# Patient Record
Sex: MALE | Race: WHITE | NOT HISPANIC OR LATINO | Employment: FULL TIME | URBAN - METROPOLITAN AREA
[De-identification: names, ages, dates, MRNs, and addresses within clinical notes are randomized per-mention and may not be internally consistent; named-entity substitution may affect disease eponyms.]

---

## 2019-04-02 ENCOUNTER — COMMUNICATION - HEALTHEAST (OUTPATIENT)
Dept: FAMILY MEDICINE | Facility: CLINIC | Age: 44
End: 2019-04-02

## 2019-04-02 ENCOUNTER — OFFICE VISIT - HEALTHEAST (OUTPATIENT)
Dept: FAMILY MEDICINE | Facility: CLINIC | Age: 44
End: 2019-04-02

## 2019-04-02 ENCOUNTER — RECORDS - HEALTHEAST (OUTPATIENT)
Dept: GENERAL RADIOLOGY | Facility: CLINIC | Age: 44
End: 2019-04-02

## 2019-04-02 DIAGNOSIS — Z00.00 ROUTINE GENERAL MEDICAL EXAMINATION AT A HEALTH CARE FACILITY: ICD-10-CM

## 2019-04-02 DIAGNOSIS — R05.9 COUGH: ICD-10-CM

## 2019-04-02 DIAGNOSIS — R35.0 URINARY FREQUENCY: ICD-10-CM

## 2019-04-02 DIAGNOSIS — R19.8 ABDOMINAL FULLNESS: ICD-10-CM

## 2019-04-02 DIAGNOSIS — Z23 NEED FOR TETANUS BOOSTER: ICD-10-CM

## 2019-04-02 LAB
ALBUMIN SERPL-MCNC: 4.4 G/DL (ref 3.5–5)
ALBUMIN UR-MCNC: NEGATIVE MG/DL
ALP SERPL-CCNC: 58 U/L (ref 45–120)
ALT SERPL W P-5'-P-CCNC: 21 U/L (ref 0–45)
AMYLASE SERPL-CCNC: 90 U/L (ref 5–120)
ANION GAP SERPL CALCULATED.3IONS-SCNC: 13 MMOL/L (ref 5–18)
APPEARANCE UR: CLEAR
AST SERPL W P-5'-P-CCNC: 19 U/L (ref 0–40)
BACTERIA #/AREA URNS HPF: ABNORMAL HPF
BASOPHILS # BLD AUTO: 0.1 THOU/UL (ref 0–0.2)
BASOPHILS NFR BLD AUTO: 1 % (ref 0–2)
BILIRUB SERPL-MCNC: 1.4 MG/DL (ref 0–1)
BILIRUB UR QL STRIP: NEGATIVE
BUN SERPL-MCNC: 13 MG/DL (ref 8–22)
CALCIUM SERPL-MCNC: 10.1 MG/DL (ref 8.5–10.5)
CHLORIDE BLD-SCNC: 105 MMOL/L (ref 98–107)
CHOLEST SERPL-MCNC: 228 MG/DL
CO2 SERPL-SCNC: 22 MMOL/L (ref 22–31)
COLOR UR AUTO: YELLOW
CREAT SERPL-MCNC: 0.86 MG/DL (ref 0.7–1.3)
EOSINOPHIL # BLD AUTO: 0.3 THOU/UL (ref 0–0.4)
EOSINOPHIL NFR BLD AUTO: 3 % (ref 0–6)
ERYTHROCYTE [DISTWIDTH] IN BLOOD BY AUTOMATED COUNT: 11.8 % (ref 11–14.5)
FASTING STATUS PATIENT QL REPORTED: NO
GFR SERPL CREATININE-BSD FRML MDRD: >60 ML/MIN/1.73M2
GLUCOSE BLD-MCNC: 84 MG/DL (ref 70–125)
GLUCOSE UR STRIP-MCNC: NEGATIVE MG/DL
HCT VFR BLD AUTO: 49 % (ref 40–54)
HDLC SERPL-MCNC: 49 MG/DL
HGB BLD-MCNC: 16.1 G/DL (ref 14–18)
HGB UR QL STRIP: ABNORMAL
KETONES UR STRIP-MCNC: ABNORMAL MG/DL
LDLC SERPL CALC-MCNC: 156 MG/DL
LEUKOCYTE ESTERASE UR QL STRIP: NEGATIVE
LIPASE SERPL-CCNC: 59 U/L (ref 0–52)
LYMPHOCYTES # BLD AUTO: 3 THOU/UL (ref 0.8–4.4)
LYMPHOCYTES NFR BLD AUTO: 31 % (ref 20–40)
MCH RBC QN AUTO: 29.1 PG (ref 27–34)
MCHC RBC AUTO-ENTMCNC: 32.8 G/DL (ref 32–36)
MCV RBC AUTO: 89 FL (ref 80–100)
MONOCYTES # BLD AUTO: 0.8 THOU/UL (ref 0–0.9)
MONOCYTES NFR BLD AUTO: 8 % (ref 2–10)
MUCOUS THREADS #/AREA URNS LPF: ABNORMAL LPF
NEUTROPHILS # BLD AUTO: 5.6 THOU/UL (ref 2–7.7)
NEUTROPHILS NFR BLD AUTO: 57 % (ref 50–70)
NITRATE UR QL: NEGATIVE
PH UR STRIP: 5.5 [PH] (ref 5–8)
PLATELET # BLD AUTO: 316 THOU/UL (ref 140–440)
PMV BLD AUTO: 7.2 FL (ref 7–10)
POTASSIUM BLD-SCNC: 4.4 MMOL/L (ref 3.5–5)
PROT SERPL-MCNC: 7.4 G/DL (ref 6–8)
RBC # BLD AUTO: 5.53 MILL/UL (ref 4.4–6.2)
RBC #/AREA URNS AUTO: ABNORMAL HPF
SODIUM SERPL-SCNC: 140 MMOL/L (ref 136–145)
SP GR UR STRIP: 1.02 (ref 1–1.03)
SQUAMOUS #/AREA URNS AUTO: ABNORMAL LPF
TRIGL SERPL-MCNC: 115 MG/DL
UROBILINOGEN UR STRIP-ACNC: ABNORMAL
WBC #/AREA URNS AUTO: ABNORMAL HPF
WBC: 9.8 THOU/UL (ref 4–11)

## 2019-04-02 ASSESSMENT — MIFFLIN-ST. JEOR: SCORE: 1824.17

## 2019-04-03 LAB — BACTERIA SPEC CULT: NO GROWTH

## 2019-04-09 ENCOUNTER — COMMUNICATION - HEALTHEAST (OUTPATIENT)
Dept: FAMILY MEDICINE | Facility: CLINIC | Age: 44
End: 2019-04-09

## 2019-04-09 ENCOUNTER — HOSPITAL ENCOUNTER (OUTPATIENT)
Dept: CT IMAGING | Facility: HOSPITAL | Age: 44
Discharge: HOME | End: 2019-04-09
Attending: FAMILY MEDICINE

## 2019-04-09 DIAGNOSIS — R19.8 ABDOMINAL FULLNESS: ICD-10-CM

## 2019-08-14 ENCOUNTER — OFFICE VISIT - HEALTHEAST (OUTPATIENT)
Dept: FAMILY MEDICINE | Facility: CLINIC | Age: 44
End: 2019-08-14

## 2019-08-14 DIAGNOSIS — Z72.0 TOBACCO ABUSE: ICD-10-CM

## 2019-08-14 DIAGNOSIS — F32.1 MODERATE MAJOR DEPRESSION (H): ICD-10-CM

## 2019-08-14 DIAGNOSIS — F41.1 GENERALIZED ANXIETY DISORDER: ICD-10-CM

## 2019-08-14 ASSESSMENT — MIFFLIN-ST. JEOR: SCORE: 1752.15

## 2019-09-03 ENCOUNTER — OFFICE VISIT - HEALTHEAST (OUTPATIENT)
Dept: FAMILY MEDICINE | Facility: CLINIC | Age: 44
End: 2019-09-03

## 2019-09-03 DIAGNOSIS — F32.1 MODERATE MAJOR DEPRESSION (H): ICD-10-CM

## 2019-09-03 DIAGNOSIS — F41.1 GENERALIZED ANXIETY DISORDER: ICD-10-CM

## 2019-09-03 ASSESSMENT — PATIENT HEALTH QUESTIONNAIRE - PHQ9: SUM OF ALL RESPONSES TO PHQ QUESTIONS 1-9: 3

## 2019-09-03 ASSESSMENT — MIFFLIN-ST. JEOR: SCORE: 1728.57

## 2020-03-10 ENCOUNTER — OFFICE VISIT - HEALTHEAST (OUTPATIENT)
Dept: FAMILY MEDICINE | Facility: CLINIC | Age: 45
End: 2020-03-10

## 2020-03-10 DIAGNOSIS — F32.1 MODERATE MAJOR DEPRESSION (H): ICD-10-CM

## 2020-03-10 DIAGNOSIS — F41.1 GENERALIZED ANXIETY DISORDER: ICD-10-CM

## 2020-03-10 ASSESSMENT — PATIENT HEALTH QUESTIONNAIRE - PHQ9: SUM OF ALL RESPONSES TO PHQ QUESTIONS 1-9: 6

## 2020-06-24 ENCOUNTER — COMMUNICATION - HEALTHEAST (OUTPATIENT)
Dept: FAMILY MEDICINE | Facility: CLINIC | Age: 45
End: 2020-06-24

## 2020-06-25 ENCOUNTER — COMMUNICATION - HEALTHEAST (OUTPATIENT)
Dept: FAMILY MEDICINE | Facility: CLINIC | Age: 45
End: 2020-06-25

## 2020-06-26 ENCOUNTER — OFFICE VISIT - HEALTHEAST (OUTPATIENT)
Dept: FAMILY MEDICINE | Facility: CLINIC | Age: 45
End: 2020-06-26

## 2020-06-26 ENCOUNTER — COMMUNICATION - HEALTHEAST (OUTPATIENT)
Dept: FAMILY MEDICINE | Facility: CLINIC | Age: 45
End: 2020-06-26

## 2020-06-26 DIAGNOSIS — S22.42XD CLOSED FRACTURE OF MULTIPLE RIBS OF LEFT SIDE WITH ROUTINE HEALING, SUBSEQUENT ENCOUNTER: ICD-10-CM

## 2020-06-26 RX ORDER — DOCUSATE SODIUM 100 MG/1
100 CAPSULE, LIQUID FILLED ORAL
Status: SHIPPED | COMMUNITY
Start: 2020-06-20 | End: 2022-02-10

## 2020-06-26 RX ORDER — IBUPROFEN 600 MG/1
600 TABLET, FILM COATED ORAL EVERY 6 HOURS PRN
Qty: 50 TABLET | Refills: 3 | Status: SHIPPED | OUTPATIENT
Start: 2020-06-26 | End: 2022-02-10

## 2020-07-03 ENCOUNTER — COMMUNICATION - HEALTHEAST (OUTPATIENT)
Dept: FAMILY MEDICINE | Facility: CLINIC | Age: 45
End: 2020-07-03

## 2020-07-03 ENCOUNTER — AMBULATORY - HEALTHEAST (OUTPATIENT)
Dept: FAMILY MEDICINE | Facility: CLINIC | Age: 45
End: 2020-07-03

## 2020-07-03 DIAGNOSIS — S22.42XD CLOSED FRACTURE OF MULTIPLE RIBS OF LEFT SIDE WITH ROUTINE HEALING, SUBSEQUENT ENCOUNTER: ICD-10-CM

## 2020-09-16 ENCOUNTER — COMMUNICATION - HEALTHEAST (OUTPATIENT)
Dept: FAMILY MEDICINE | Facility: CLINIC | Age: 45
End: 2020-09-16

## 2020-09-16 DIAGNOSIS — F41.1 GENERALIZED ANXIETY DISORDER: ICD-10-CM

## 2020-09-16 DIAGNOSIS — F32.1 MODERATE MAJOR DEPRESSION (H): ICD-10-CM

## 2020-10-13 ENCOUNTER — OFFICE VISIT - HEALTHEAST (OUTPATIENT)
Dept: FAMILY MEDICINE | Facility: CLINIC | Age: 45
End: 2020-10-13

## 2020-10-13 DIAGNOSIS — F32.1 MODERATE MAJOR DEPRESSION (H): ICD-10-CM

## 2020-10-13 DIAGNOSIS — F41.1 GENERALIZED ANXIETY DISORDER: ICD-10-CM

## 2020-10-13 ASSESSMENT — MIFFLIN-ST. JEOR: SCORE: 1785.04

## 2020-10-13 ASSESSMENT — PATIENT HEALTH QUESTIONNAIRE - PHQ9: SUM OF ALL RESPONSES TO PHQ QUESTIONS 1-9: 0

## 2021-05-26 ASSESSMENT — PATIENT HEALTH QUESTIONNAIRE - PHQ9
SUM OF ALL RESPONSES TO PHQ QUESTIONS 1-9: 0
SUM OF ALL RESPONSES TO PHQ QUESTIONS 1-9: 3

## 2021-05-27 ASSESSMENT — PATIENT HEALTH QUESTIONNAIRE - PHQ9: SUM OF ALL RESPONSES TO PHQ QUESTIONS 1-9: 6

## 2021-05-27 NOTE — PROGRESS NOTES
"Assessment:      Healthy male exam.    1. Routine general medical examination at a health care facility  Lipid Profile   2. Abdominal fullness  CT Abdomen Pelvis With Oral With IV Contrast    Comprehensive Metabolic Panel    Amylase    Lipase    HM1(CBC and Differential)    HM1 (CBC with Diff)   3. Cough  XR Chest 2 Views   4. Urinary frequency  Urinalysis    Culture, Urine   5. Need for tetanus booster  Td, Preservative Free (green label)          Plan:      This is a 42 yo male, new to clinic, here for physical exam - this was prompted by the loss of 13 people in his life in the last several months.  He describes them being of \"all ages\" and \"all diagnoses\", but it made him feel very vulnerable.    1.  Abdominal fullness - this has been persistent for several months - he can't lay on one side - will check CT - abdomen; labs as noted  2.  Cough - still smoking - discussed cessation (he is not ready); will check CXR  3.  Urinary Frequency - check UA/UC - doubt infection given duration of symptoms.  4.  Health Maintenance - due for tetanus booster - given today    Subjective:      Roderick Loyola is a 43 y.o. male who presents for an annual exam. The patient reports that there is not domestic violence in his life.     Here because all his friends are dying  Feels like something in his belly - like he really really hungry   Smoking reefers - smokes every day  Smokes cigarettes - 1 ppd  No meth x 16 years - quit on his own  \"i'm so hyper, I smoke because I'm bored\"  Works as  - plastics  Dad passed away age 29 - MVA  PGF passed away age 35 - pancreatic cancer  Had a great step dad - switched last name to step dad's when he got     Had a double hernia when he was 5 years old  Feels like something below his umbilicus  Pees \"like a pregnant women\"  Drinking water  No other medications    With his partner 18 years;  8 years;   Raised 2 step daughters - 1, dental hygienist, 1 in criminal " good  Has 1 grandson - 2-1/2 years old    Mom is alive - 68 yo  - healthy - has some ulcers  She had alcoholism on her side of family - cirrhosis of liver    PGM - bone cancer; pancreatic cancer -        Healthy Habits:   Regular Exercise: No  Sunscreen Use: No  Healthy Diet: trying  Dental Visits Regularly: No  Seat Belt: Yes  Sexually active: Yes  Monthly Self Testicular Exams:  No        Immunization History   Administered Date(s) Administered     Td, adult adsorbed, PF 04/02/2019     Tdap 03/19/2012     Immunization status: due for tetanus.    No exam data present     No current outpatient medications on file.     No current facility-administered medications for this visit.      History reviewed. No pertinent past medical history.  History reviewed. No pertinent surgical history.  Patient has no known allergies.  History reviewed. No pertinent family history.  Social History     Socioeconomic History     Marital status:      Spouse name: Not on file     Number of children: Not on file     Years of education: Not on file     Highest education level: Not on file   Occupational History     Not on file   Social Needs     Financial resource strain: Not on file     Food insecurity:     Worry: Not on file     Inability: Not on file     Transportation needs:     Medical: Not on file     Non-medical: Not on file   Tobacco Use     Smoking status: Current Every Day Smoker     Types: Pipe     Smokeless tobacco: Never Used   Substance and Sexual Activity     Alcohol use: Not on file     Drug use: Not on file     Sexual activity: Not on file   Lifestyle     Physical activity:     Days per week: Not on file     Minutes per session: Not on file     Stress: Not on file   Relationships     Social connections:     Talks on phone: Not on file     Gets together: Not on file     Attends Jain service: Not on file     Active member of club or organization: Not on file     Attends meetings of clubs or organizations: Not  "on file     Relationship status: Not on file     Intimate partner violence:     Fear of current or ex partner: Not on file     Emotionally abused: Not on file     Physically abused: Not on file     Forced sexual activity: Not on file   Other Topics Concern     Not on file   Social History Narrative     Not on file       Review of Systems  Review of Systems     Pertinent positives as noted in HPI; otherwise 12 point ROS negative.        Objective:     Vitals:    04/02/19 1533   BP: 126/76   Pulse: 80   Resp: 20   Temp: 98.1  F (36.7  C)   TempSrc: Oral   Weight: 198 lb 4 oz (89.9 kg)   Height: 6' 0.75\" (1.848 m)     Body mass index is 26.34 kg/m .    Physical  Physical Exam    EXAM:  /76 (Patient Site: Right Arm, Patient Position: Sitting, Cuff Size: Adult Regular)   Pulse 80   Temp 98.1  F (36.7  C) (Oral)   Resp 20   Ht 6' 0.75\" (1.848 m)   Wt 198 lb 4 oz (89.9 kg)   BMI 26.34 kg/m     Gen:  NAD, appears well, well-hydrated  HEENT:  TMs nl, oropharynx benign, nasal mucosa nl, conjunctiva clear  Neck:  Supple, no adenopathy, no thyromegaly, no carotid bruits, no JVD  Lungs:  Clear to auscultation bilaterally  Cor:  RRR no murmur  Abd:  Soft, nontender, BS+, no masses, no guarding or rebound, no HSM  :  Nl male genitalia  Extr:  Neg.  Neuro:  No asymmetry, Nl motor tone/strength, nl sensation, reflexes =, gait nl, nl coordination, CN intact,   Skin:  Warm/dry            "

## 2021-05-31 NOTE — PROGRESS NOTES
"ASSESSMENT/PLAN:  1. Generalized anxiety disorder  buPROPion (WELLBUTRIN XL) 150 MG 24 hr tablet   2. Moderate major depression (H)  buPROPion (WELLBUTRIN XL) 150 MG 24 hr tablet       This is a 43 yo male with:  1.  Underlying mental health disorder - has history of ADD as a child, chemical dependency as an adult - used meth x years (none x 16 years); current daily user of marijuana.  Also an active tobacco smoker (up to 3 ppd).  Now with significant anxiety and major depression.  Is troubled by breakdown of his marriage - trying to work on that, but has been \"out\" of house.  Wants to reconnect with his wife.    ELVER 7 Total Score: 12 (8/14/2019  8:00 AM)  PHQ-9 Total Score: 8 (8/14/2019  8:00 AM)  Patient is not willing to think about therapy at this time, but willing to talk about medications - believes he used Bupropion in past with some success.  We discussed risk/benefits.  Will start Bupropion daily -recheck after 4 weeks (before the next refill).    I have personally spent 30 minutes with this patient today - all in counseling time.      Return in about 4 weeks (around 9/11/2019) for Recheck.      There are no discontinued medications.  There are no Patient Instructions on file for this visit.    Chief Complaint:  Chief Complaint   Patient presents with     Depression     Anxiety       HPI:   Roderick Loyola is a 44 y.o. 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    History reviewed. No pertinent past medical history.  History reviewed. No pertinent surgical history.  Social History     Socioeconomic History     Marital status:      Spouse name: Not on file     Number of children: Not on file     Years of education: Not on file     Highest education level: Not on file   Occupational History     Not on file   Social Needs     Financial resource strain: Not on file     Food insecurity:     Worry: Not on file     Inability: Not on file     Transportation needs:     Medical: Not on file     Non-medical: Not on file   Tobacco Use     Smoking status: Current Every Day Smoker     Types: Pipe     Smokeless tobacco: Never Used   Substance and Sexual Activity     Alcohol use: Not on file     Drug use: Not on file     Sexual activity: Not on file   Lifestyle     Physical activity:     Days per week: Not on file     Minutes per session: Not on file     Stress: Not on file   Relationships     Social connections:     Talks on phone: Not on file     Gets together: Not on file     Attends Buddhism service: Not on file     Active member of club or organization: Not on file     Attends meetings of clubs or organizations: Not on file     Relationship status: Not on file     Intimate partner violence:     Fear of current or ex partner: Not on file     Emotionally abused: Not on file     Physically abused: Not on file     Forced sexual activity: Not on file   Other Topics Concern     Not on file   Social History Narrative     Not on file     History reviewed. No pertinent family history.    Meds:    Current Outpatient Medications:      buPROPion (WELLBUTRIN XL) 150 MG 24 hr tablet, Take 1 tablet (150 mg total) by mouth daily., Disp: 30 tablet, Rfl: 2    Allergies:  No Known Allergies    ROS:  Pertinent positives as noted in HPI; otherwise 12 point ROS negative.      Physical " Exam:  EXAM:  /76 (Patient Site: Right Arm, Patient Position: Sitting, Cuff Size: Adult Regular)   Pulse 78   Resp 16   Ht 6' (1.829 m)   Wt 185 lb (83.9 kg)   BMI 25.09 kg/m     Gen:  NAD, appears well, well-hydrated, agitated/pressured speech  Extr:  Neg.  Neuro:  No asymmetry  Skin:  Warm/dry

## 2021-05-31 NOTE — PROGRESS NOTES
ASSESSMENT/PLAN:  1. Generalized anxiety disorder  buPROPion (WELLBUTRIN XL) 150 MG 24 hr tablet   2. Moderate major depression (H)  buPROPion (WELLBUTRIN XL) 150 MG 24 hr tablet       This is a 43 yo male with h/o significant anxiety and depression; currently has aggravation of this symptoms due to relationship issues.  He tells me he has resolved some of these issues and is feeling a little better, but wonders about dosing of his medication.  We discussed possibly increasing his Wellbutrin - doubling this current dosing.  He will try this at this time.  We discussed trying to quit smoking marijuana as that leads to other challenges for him.   PHQ-9 Total Score: 3 (9/3/2019  4:00 PM)  ELVER 7 Total Score: 12 (8/14/2019  8:00 AM)    Patient has improved symptoms, in general, but still finding it difficult to get through his day - will   Return in about 3 months (around 12/3/2019) for Recheck.      Medications Discontinued During This Encounter   Medication Reason     buPROPion (WELLBUTRIN XL) 150 MG 24 hr tablet Reorder     There are no Patient Instructions on file for this visit.    Chief Complaint:  Chief Complaint   Patient presents with     Follow-up       HPI:   Roderick Looyla is a 44 y.o. male c/o  Less marijuana use - 3 joints in last weekend  Getting back together with wife  Wife's brother hung himself in past; her daughter is talking about putting a gun to her head      PMH:   Patient Active Problem List    Diagnosis Date Noted     Tobacco abuse 08/14/2019     History reviewed. No pertinent past medical history.  History reviewed. No pertinent surgical history.  Social History     Socioeconomic History     Marital status:      Spouse name: Not on file     Number of children: Not on file     Years of education: Not on file     Highest education level: Not on file   Occupational History     Not on file   Social Needs     Financial resource strain: Not on file     Food insecurity:     Worry: Not on  file     Inability: Not on file     Transportation needs:     Medical: Not on file     Non-medical: Not on file   Tobacco Use     Smoking status: Current Every Day Smoker     Types: Pipe     Smokeless tobacco: Never Used   Substance and Sexual Activity     Alcohol use: Not on file     Drug use: Not on file     Sexual activity: Not on file   Lifestyle     Physical activity:     Days per week: Not on file     Minutes per session: Not on file     Stress: Not on file   Relationships     Social connections:     Talks on phone: Not on file     Gets together: Not on file     Attends Synagogue service: Not on file     Active member of club or organization: Not on file     Attends meetings of clubs or organizations: Not on file     Relationship status: Not on file     Intimate partner violence:     Fear of current or ex partner: Not on file     Emotionally abused: Not on file     Physically abused: Not on file     Forced sexual activity: Not on file   Other Topics Concern     Not on file   Social History Narrative     Not on file     History reviewed. No pertinent family history.    Meds:    Current Outpatient Medications:      buPROPion (WELLBUTRIN XL) 150 MG 24 hr tablet, Take 2 tablets (300 mg total) by mouth daily., Disp: 60 tablet, Rfl: 2    Allergies:  No Known Allergies    ROS:  Pertinent positives as noted in HPI; otherwise 12 point ROS negative.      Physical Exam:  EXAM:  /68 (Patient Site: Left Arm, Patient Position: Sitting, Cuff Size: Adult Regular)   Pulse 86   Temp 97.4  F (36.3  C) (Oral)   Resp 18   Ht 6' (1.829 m)   Wt 179 lb 12.8 oz (81.6 kg)   BMI 24.39 kg/m     Gen:  NAD, appears well, well-hydrated, anxious  HEENT:  TMs nl, oropharynx benign, nasal mucosa nl, conjunctiva clear  Neck:  Supple, no adenopathy, no thyromegaly, no carotid bruits, no JVD  Lungs:  Clear to auscultation bilaterally  Cor:  RRR no murmur  Abd:  Soft, nontender, BS+, no masses, no guarding or rebound, no HSM  Extr:   Neg.  Neuro:  No asymmetry  Skin:  Warm/dry

## 2021-06-02 VITALS — WEIGHT: 198.25 LBS | HEIGHT: 73 IN | BODY MASS INDEX: 26.27 KG/M2

## 2021-06-03 VITALS
BODY MASS INDEX: 24.35 KG/M2 | WEIGHT: 179.8 LBS | TEMPERATURE: 97.4 F | SYSTOLIC BLOOD PRESSURE: 120 MMHG | DIASTOLIC BLOOD PRESSURE: 68 MMHG | RESPIRATION RATE: 18 BRPM | HEART RATE: 86 BPM | HEIGHT: 72 IN

## 2021-06-03 VITALS — WEIGHT: 185 LBS | HEIGHT: 72 IN | BODY MASS INDEX: 25.06 KG/M2

## 2021-06-04 VITALS
SYSTOLIC BLOOD PRESSURE: 109 MMHG | RESPIRATION RATE: 18 BRPM | HEART RATE: 85 BPM | BODY MASS INDEX: 26.18 KG/M2 | WEIGHT: 193 LBS | DIASTOLIC BLOOD PRESSURE: 72 MMHG

## 2021-06-04 VITALS
HEIGHT: 72 IN | TEMPERATURE: 98.6 F | BODY MASS INDEX: 26.28 KG/M2 | DIASTOLIC BLOOD PRESSURE: 74 MMHG | RESPIRATION RATE: 18 BRPM | SYSTOLIC BLOOD PRESSURE: 121 MMHG | WEIGHT: 194 LBS | HEART RATE: 105 BPM

## 2021-06-04 VITALS
DIASTOLIC BLOOD PRESSURE: 85 MMHG | WEIGHT: 197 LBS | SYSTOLIC BLOOD PRESSURE: 127 MMHG | HEART RATE: 73 BPM | BODY MASS INDEX: 26.72 KG/M2 | RESPIRATION RATE: 16 BRPM

## 2021-06-06 NOTE — PROGRESS NOTES
ASSESSMENT/PLAN:  1. Generalized anxiety disorder  buPROPion (WELLBUTRIN XL) 150 MG 24 hr tablet   2. Moderate major depression (H)  buPROPion (WELLBUTRIN XL) 150 MG 24 hr tablet       This is a 45 yo male with   Generalized anxiety disorder and moderate major depression; he reports that he has gotten better - feels that his medication is working well currently - isn't interested in higher dosing.    ELVER 7 Total Score: 12 (8/14/2019  8:00 AM)  PHQ-9 Total Score: 6 (3/10/2020  3:00 PM)  Will refill his current Bupropion dosing.        Return in about 6 months (around 9/10/2020) for Recheck.      Medications Discontinued During This Encounter   Medication Reason     buPROPion (WELLBUTRIN XL) 150 MG 24 hr tablet Reorder     There are no Patient Instructions on file for this visit.    Chief Complaint:  Chief Complaint   Patient presents with     Follow Up       HPI:   Roderick Loyola is a 44 y.o. male c/o  Things are going better   Medicine doesn't calm him down but it calms his brain down  Fixing up the house to sell it - got overwhelming - now, looking for houses  Just taking 1 per day  When took 2/day, drank a mocha with it and felt more anxious  Was going to pay off bills -       PMH:   Patient Active Problem List    Diagnosis Date Noted     Tobacco abuse 08/14/2019     History reviewed. No pertinent past medical history.  History reviewed. No pertinent surgical history.  Social History     Socioeconomic History     Marital status:      Spouse name: Not on file     Number of children: Not on file     Years of education: Not on file     Highest education level: Not on file   Occupational History     Not on file   Social Needs     Financial resource strain: Not on file     Food insecurity     Worry: Not on file     Inability: Not on file     Transportation needs     Medical: Not on file     Non-medical: Not on file   Tobacco Use     Smoking status: Current Every Day Smoker     Types: Pipe     Smokeless  tobacco: Never Used   Substance and Sexual Activity     Alcohol use: Not on file     Drug use: Not on file     Sexual activity: Not on file   Lifestyle     Physical activity     Days per week: Not on file     Minutes per session: Not on file     Stress: Not on file   Relationships     Social connections     Talks on phone: Not on file     Gets together: Not on file     Attends Taoism service: Not on file     Active member of club or organization: Not on file     Attends meetings of clubs or organizations: Not on file     Relationship status: Not on file     Intimate partner violence     Fear of current or ex partner: Not on file     Emotionally abused: Not on file     Physically abused: Not on file     Forced sexual activity: Not on file   Other Topics Concern     Not on file   Social History Narrative     Not on file     History reviewed. No pertinent family history.    Meds:    Current Outpatient Medications:      buPROPion (WELLBUTRIN XL) 150 MG 24 hr tablet, Take 1 tablet (150 mg total) by mouth daily., Disp: 90 tablet, Rfl: 1    Allergies:  No Known Allergies    ROS:  Pertinent positives as noted in HPI; otherwise 12 point ROS negative.      Physical Exam:  EXAM:  /85 (Patient Site: Right Arm, Patient Position: Sitting, Cuff Size: Adult Large)   Pulse 73   Resp 16   Wt 197 lb (89.4 kg)   BMI 26.72 kg/m     Gen:  NAD, appears well, well-hydrated, speech sl pressured  HEENT:  TMs nl, oropharynx benign, nasal mucosa nl, conjunctiva clear  Neck:  Supple, no adenopathy, no thyromegaly, no carotid bruits, no JVD  Lungs:  Clear to auscultation bilaterally  Cor:  RRR no murmur  Abd:  Soft, nontender, BS+, no masses, no guarding or rebound, no HSM  Extr:  Neg.  Neuro:  No asymmetry  Skin:  Warm/dry        Results:

## 2021-06-09 ENCOUNTER — COMMUNICATION - HEALTHEAST (OUTPATIENT)
Dept: FAMILY MEDICINE | Facility: CLINIC | Age: 46
End: 2021-06-09

## 2021-06-09 DIAGNOSIS — F32.1 MODERATE MAJOR DEPRESSION (H): ICD-10-CM

## 2021-06-09 DIAGNOSIS — F41.1 GENERALIZED ANXIETY DISORDER: ICD-10-CM

## 2021-06-09 NOTE — PROGRESS NOTES
Assessment/ Plan  1. Closed fracture of multiple ribs of left side with routine healing, subsequent encounter  Additional Vicodin use to help sleep.  He has a few Tylenol 3 left, recommend using mostly ibuprofen plus acetaminophen during the day.    Recommend incentive spirometry for at least deep breathing to prevent pneumonia.    Work restrictions filled out.  Excused him through July 5, back to work July 6 with limited lifting, bending or twisting through July 20.  Please see letter.  Also, short-term disability form filled out.        - ibuprofen (ADVIL,MOTRIN) 600 MG tablet; Take 1 tablet (600 mg total) by mouth every 6 (six) hours as needed for pain.  Dispense: 50 tablet; Refill: 3  - HYDROcodone-acetaminophen 5-325 mg per tablet; Take 1-2 tablets by mouth every 6 (six) hours as needed for pain. Maximum 8 tablets/day  Dispense: 20 tablet; Refill: 0    Body mass index is 26.18 kg/m .    Subjective  CC:  Chief Complaint   Patient presents with     Hospital Visit Follow Up     broken ribs     HPI:  Roderick is a 45-year-old with history of a fall into the post on 6/20/2020 gaining transverse fractures of posterior lateral ribs on left side at at 3 levels 9, 10, 11.  Patient had been drinking alcohol and fell 3 to 4 feet onto a fence post.  CT scan showed tiny amount of subcutaneous and intramuscular emphysema but no significant pneumothorax.  He was treated with Tylenol 3 for severe pain, then told to use ibuprofen/Tylenol.  Incentive spirometry was prescribed for the following 3 days.  Patient subsequently had a forceful cough with increased pain 6/23/2020 return to the emergency room.  Chest x-ray was done at that time, did not show any significant changes.  He was given hydrocodone/acetaminophen    Patient is slated to go back to work soon and does not think he can do this.  Has a vacation scheduled after July 4 and hoping to be off work till that time so that he can recover.  Still needing opiate medication to  sleep.  Insist that he is not overusing this.  Openly discussed his history of methamphetamine addiction and sobriety which he is quite proud of.  Has no intention of getting hooked on opiates.  His pain is getting better and he is decreased his us  Patient Active Problem List   Diagnosis     Tobacco abuse     Current medications reviewed as follows:  Current Outpatient Medications on File Prior to Visit   Medication Sig     buPROPion (WELLBUTRIN XL) 150 MG 24 hr tablet Take 1 tablet (150 mg total) by mouth daily.     docusate sodium (COLACE) 100 MG capsule Take 100 mg by mouth.     HYDROcodone-acetaminophen 5-325 mg per tablet Take 1 tablet by mouth.     No current facility-administered medications on file prior to visit.      Social History     Tobacco Use   Smoking Status Current Every Day Smoker     Types: Pipe   Smokeless Tobacco Never Used     Social History     Social History Narrative     Not on file     Patient Care Team:  Gracy Lawson MD as PCP - General (Family Medicine)  Gracy Lawson MD as Assigned PCP  ROS  Denies shortness of breath, fever, chills, cough      Objective  Physical Exam  Vitals:    06/26/20 0949   BP: 109/72   Patient Site: Right Arm   Patient Position: Sitting   Cuff Size: Adult Regular   Pulse: 85   Resp: 18   Weight: 193 lb (87.5 kg)     Patient appears somewhat older than stated age, no acute distress.  Chest clear to auscultation bilaterally, equal breath sounds bilaterally.  Large bruise on left posterior lateral chest wall.  Diagnostics  Reviewed x-ray results as well as labs.  Patient did have an elevated white blood cell count, presumably from demargination.    Please note: Voice recognition software was used in this dictation.  It may therefore contain typographical errors.

## 2021-06-09 NOTE — TELEPHONE ENCOUNTER
Medication Request  Medication name: Hydrocodone   [facility]ed [facility]  Reason for request: Patient wants more pain medication.   When did you use medication last?:  Last night to go to sleep. 07/02/20  Patient offered appointment:  yes.  Patient is leaving out of town.  Okay to leave a detailed message: yes

## 2021-06-09 NOTE — TELEPHONE ENCOUNTER
Who is requesting the letter?  Roderick  Why is the letter needed? Patient was seen 6/20/20 at [facility] ED for rib pain.  He has been diagnosed with 3 broken ribs.  He does not feel he can work this week and next week due to pain.  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  Address the letter to [facility].  How would you like this letter returned?   Patient will  when it is ready.    Okay to leave a detailed message? Yes

## 2021-06-09 NOTE — TELEPHONE ENCOUNTER
Who is calling:  Patient   Reason for Call:  calling to check on below request, please advise asap!  Date of last appointment with primary care: 3/10/2020  Okay to leave a detailed message: Yes

## 2021-06-09 NOTE — TELEPHONE ENCOUNTER
He should have gotten a work letter from the ER.  If he needs something beyond that, we will need a visit.  I don't have any information from [facility].

## 2021-06-09 NOTE — TELEPHONE ENCOUNTER
Called and spoke with Roderick Loyola , Message was given, Roderick Loyola  understood, no further questions.

## 2021-06-09 NOTE — TELEPHONE ENCOUNTER
New Appointment Needed  What is the reason for the visit:    Inpatient/ED Follow Up Appt Request  At what hospital or facility were you seen?: [facility]  What is the reason you were seen?: Broke three ribs  What date were you admitted?: 6/20/2020  What date were you discharged?: 6/20/2020  What was the recommended timeframe for your follow up appointment?: 7 days  Provider Preference: PCP only  How soon do you need to be seen?: As soon as possible  Okay to leave a detailed message:  Yes

## 2021-06-09 NOTE — TELEPHONE ENCOUNTER
Called and spoke with Roderick Loyola, Message was given, Roderick Loyola understood, no further questions. Scheduled patient for in clinic visit for EDF on 6/26/2020.

## 2021-06-09 NOTE — TELEPHONE ENCOUNTER
Medication Request  Medication name: Norco  Requested Pharmacy: [facility]  Reason for request: Patient requesting something for his rib pain. Please advise asap  When did you use medication last?:  This morning  Patient offered appointment:  patient declined  Okay to leave a detailed message: yes

## 2021-06-10 RX ORDER — BUPROPION HYDROCHLORIDE 150 MG/1
TABLET ORAL
Qty: 30 TABLET | Refills: 0 | Status: SHIPPED | OUTPATIENT
Start: 2021-06-10 | End: 2021-09-28

## 2021-06-12 NOTE — PROGRESS NOTES
"ASSESSMENT/PLAN:  1. Generalized anxiety disorder  buPROPion (WELLBUTRIN XL) 150 MG 24 hr tablet   2. Moderate major depression (H)  buPROPion (WELLBUTRIN XL) 150 MG 24 hr tablet       This is a 46 yo male with significant anxiety and depression - much of this is related to difficult relationships.  He reports feeling happier with life in general for now.  Has been using Bupropion.  He and his wife are getting along - one daughter has purchased a home and will be moving out soon.    PHQ-9 Total Score: 0 (10/13/2020 12:15 PM)  Doing well with current dose of Bupropion - does not want to change it.  I think it is reasonable for him to take this medication.  OK to refill today.     Return in about 6 months (around 4/13/2021) for Recheck.      Medications Discontinued During This Encounter   Medication Reason     HYDROcodone-acetaminophen 5-325 mg per tablet Therapy completed     HYDROcodone-acetaminophen 5-325 mg per tablet Therapy completed     buPROPion (WELLBUTRIN XL) 150 MG 24 hr tablet Reorder     There are no Patient Instructions on file for this visit.    Chief Complaint:  Chief Complaint   Patient presents with     medication check     Leg Pain     right side       HPI:   Roderick Loyola is a 45 y.o. male c/o  Now working 2nd shift  Was tearing down his garage - 7/2/2020 - fell off a retaining wall (had some alcohol on board) - broke several ribs   Took pain pills x 3 weeks, then off - \"I don't like those things\"    Has sharp pain in right leg - comes and goes  Ribs healed - last couple days, feels a little pain        PMH:   Patient Active Problem List    Diagnosis Date Noted     Tobacco abuse 08/14/2019     No past medical history on file.  No past surgical history on file.  Social History     Socioeconomic History     Marital status:      Spouse name: Not on file     Number of children: Not on file     Years of education: Not on file     Highest education level: Not on file   Occupational History " "    Not on file   Social Needs     Financial resource strain: Not on file     Food insecurity     Worry: Not on file     Inability: Not on file     Transportation needs     Medical: Not on file     Non-medical: Not on file   Tobacco Use     Smoking status: Current Every Day Smoker     Types: Pipe     Smokeless tobacco: Never Used   Substance and Sexual Activity     Alcohol use: Not on file     Drug use: Not on file     Sexual activity: Not on file   Lifestyle     Physical activity     Days per week: Not on file     Minutes per session: Not on file     Stress: Not on file   Relationships     Social connections     Talks on phone: Not on file     Gets together: Not on file     Attends [employer] service: Not on file     Active member of club or organization: Not on file     Attends meetings of clubs or organizations: Not on file     Relationship status: Not on file     Intimate partner violence     Fear of current or ex partner: Not on file     Emotionally abused: Not on file     Physically abused: Not on file     Forced sexual activity: Not on file   Other Topics Concern     Not on file   Social History Narrative     Not on file     No family history on file.    Meds:    Current Outpatient Medications:      buPROPion (WELLBUTRIN XL) 150 MG 24 hr tablet, TAKE 1 TABLET(150 MG) BY MOUTH DAILY, Disp: 90 tablet, Rfl: 1     docusate sodium (COLACE) 100 MG capsule, Take 100 mg by mouth., Disp: , Rfl:      ibuprofen (ADVIL,MOTRIN) 600 MG tablet, Take 1 tablet (600 mg total) by mouth every 6 (six) hours as needed for pain., Disp: 50 tablet, Rfl: 3    Allergies:  No Known Allergies    ROS:  Pertinent positives as noted in HPI; otherwise 12 point ROS negative.      Physical Exam:  EXAM:  /74 (Patient Site: Right Arm, Patient Position: Sitting, Cuff Size: Adult Regular)   Pulse (!) 105   Temp 98.6  F (37  C) (Tympanic)   Resp 18   Ht 5' 11.5\" (1.816 m)   Wt 194 lb (88 kg)   BMI 26.68 kg/m     Gen:  NAD, appears well, " well-hydrated, agitated  HEENT:  TMs nl, oropharynx benign, nasal mucosa nl, conjunctiva clear  Neck:  Supple, no adenopathy, no thyromegaly, no carotid bruits, no JVD  Lungs:  Clear to auscultation bilaterally  Cor:  RRR no murmur  Abd:  Soft, nontender, BS+, no masses, no guarding or rebound, no HSM  Extr:  Neg.  Neuro:  No asymmetry  Skin:  Warm/dry        Results:

## 2021-06-16 PROBLEM — Z72.0 TOBACCO ABUSE: Status: ACTIVE | Noted: 2019-08-14

## 2021-06-19 NOTE — LETTER
Letter by Gracy Lawson MD at      Author: Gracy Lawson MD Service: -- Author Type: --    Filed:  Encounter Date: 4/9/2019 Status: (Other)         Roderick Loyola  [address]  [place] [address]             April 9, 2019         Dear Mr. Loyola,    Below are the results from your recent visit:    Resulted Orders   Comprehensive Metabolic Panel   Result Value Ref Range    Sodium 140 136 - 145 mmol/L    Potassium 4.4 3.5 - 5.0 mmol/L    Chloride 105 98 - 107 mmol/L    CO2 22 22 - 31 mmol/L    Anion Gap, Calculation 13 5 - 18 mmol/L    Glucose 84 70 - 125 mg/dL    BUN 13 8 - 22 mg/dL    Creatinine 0.86 0.70 - 1.30 mg/dL    GFR MDRD Af Amer >60 >60 mL/min/1.73m2    GFR MDRD Non Af Amer >60 >60 mL/min/1.73m2    Bilirubin, Total 1.4 (H) 0.0 - 1.0 mg/dL    Calcium 10.1 8.5 - 10.5 mg/dL    Protein, Total 7.4 6.0 - 8.0 g/dL    Albumin 4.4 3.5 - 5.0 g/dL    Alkaline Phosphatase 58 45 - 120 U/L    AST 19 0 - 40 U/L    ALT 21 0 - 45 U/L    Narrative    Fasting Glucose reference range is 70-99 mg/dL per  American Diabetes Association (ADA) guidelines.   Amylase   Result Value Ref Range    Amylase 90 5 - 120 U/L   Lipase   Result Value Ref Range    Lipase 59 (H) 0 - 52 U/L   Urinalysis   Result Value Ref Range    Color, UA Yellow Colorless, Yellow, Straw, Light Yellow    Clarity, UA Clear Clear    Glucose, UA Negative Negative    Bilirubin, UA Negative Negative    Ketones, UA 40 mg/dL (!) Negative    Specific Gravity, UA 1.025 1.005 - 1.030    Blood, UA Trace (!) Negative    pH, UA 5.5 5.0 - 8.0    Protein, UA Negative Negative mg/dL    Urobilinogen, UA 0.2 E.U./dL 0.2 E.U./dL, 1.0 E.U./dL    Nitrite, UA Negative Negative    Leukocytes, UA Negative Negative    Bacteria, UA Few (!) None Seen hpf    RBC, UA 3-5 (!) None Seen, 0-2 hpf    WBC, UA 0-5 None Seen, 0-5 hpf    Squam Epithel, UA 5-10 (!) None Seen, 0-5 lpf    Mucus, UA Few (!) None Seen lpf   Culture, Urine   Result Value Ref Range     Culture No Growth    Lipid Profile   Result Value Ref Range    Triglycerides 115 <=149 mg/dL    Cholesterol 228 (H) <=199 mg/dL    LDL Calculated 156 (H) <=129 mg/dL    HDL Cholesterol 49 >=40 mg/dL    Patient Fasting > 8hrs? No    HM1 (CBC with Diff)   Result Value Ref Range    WBC 9.8 4.0 - 11.0 thou/uL    RBC 5.53 4.40 - 6.20 mill/uL    Hemoglobin 16.1 14.0 - 18.0 g/dL    Hematocrit 49.0 40.0 - 54.0 %    MCV 89 80 - 100 fL    MCH 29.1 27.0 - 34.0 pg    MCHC 32.8 32.0 - 36.0 g/dL    RDW 11.8 11.0 - 14.5 %    Platelets 316 140 - 440 thou/uL    MPV 7.2 7.0 - 10.0 fL    Neutrophils % 57 50 - 70 %    Lymphocytes % 31 20 - 40 %    Monocytes % 8 2 - 10 %    Eosinophils % 3 0 - 6 %    Basophils % 1 0 - 2 %    Neutrophils Absolute 5.6 2.0 - 7.7 thou/uL    Lymphocytes Absolute 3.0 0.8 - 4.4 thou/uL    Monocytes Absolute 0.8 0.0 - 0.9 thou/uL    Eosinophils Absolute 0.3 0.0 - 0.4 thou/uL    Basophils Absolute 0.1 0.0 - 0.2 thou/uL       Your labs are generally normal.  The bilirubin is just slightly high - this is a liver test.  Your CT scan is normal as well - and shows no abnormalities.  I would suggest recheck on this lab in a couple months.      Please call with questions or contact us using [employer].    Sincerely,        Electronically signed by Gracy Lawson MD

## 2021-06-19 NOTE — LETTER
"Letter by Gracy Lawson MD at      Author: Gracy Lawson MD Service: -- Author Type: --    Filed:  Encounter Date: 4/9/2019 Status: (Other)         Roderick Loyola  [address]  [place] [address]             April 9, 2019         Dear Mr. Loyola,    Below are the results from your recent visit:    Resulted Orders   CT Abdomen Pelvis With Oral With IV Contrast    Narrative    EXAM: CT ABDOMEN PELVIS W ORAL W IV CONTRAST  LOCATION: [facility]  DATE/TIME: 4/9/2019 4:28 PM    INDICATION: abdominal pain - \"fullness\"  COMPARISON: None.  TECHNIQUE: Helical enhanced thin-section CT scan of the abdomen and pelvis was performed following injection of IV contrast. Multiplanar reformats were obtained. Dose reduction techniques were used.  CONTRAST: Iohexol (Omni) 100 mL    FINDINGS:   LUNG BASES: Negative.    ABDOMEN: Liver, gallbladder, bile ducts, spleen, pancreas, adrenal glands, and kidneys are unremarkable.    PELVIS: Mild colonic diverticulosis. No dilated bowel or inflammation. No free fluid or gas. Normal appendix.    MUSCULOSKELETAL: Negative.      Impression    CONCLUSION:   No acute process in the abdomen or pelvis.         Your scan is normal. Nothing \"lurking\" in your belly!    Please call with questions or contact us using [employer].    Sincerely,        Electronically signed by Gracy Lawson MD       "

## 2021-06-19 NOTE — LETTER
Letter by Gracy Lawson MD at      Author: Gracy Lawson MD Service: -- Author Type: --    Filed:  Encounter Date: 4/2/2019 Status: (Other)         Roderick Loyola  [address] [place] [address]             April 2, 2019         Dear Mr. Loyola,    Below are the results from your recent visit:    Resulted Orders   XR Chest 2 Views    Narrative    XR CHEST 2 VIEWS  4/2/2019 4:46 PM    INDICATION: Cough.  COMPARISON: None.    FINDINGS: Negative chest. Lungs are clear.         Your chest xray is normal.     Please call with questions or contact us using [employer].    Sincerely,        Electronically signed by Gracy Lawson MD

## 2021-06-20 NOTE — LETTER
Letter by Rod Alvarado MD at      Author: Rod Alvarado MD Service: -- Author Type: --    Filed:  Encounter Date: 6/26/2020 Status: (Other)         6/26/2020  Roderick Loyola      To Whom It May Concern,    Roderick Loyola was seen today for follow-up of rib fracture sustained 6/20/2020.    He should be released from work from date of injury through 7/5/2022.  He may return to work 7/6/2020 with following directions in place.  No lifting more than 25 pounds, no lifting carrying of weights 11 to 25 pounds any more than occasionally, and maximum of frequent lifting/carrying of any weight whatsoever.  No more than occasional bending and twisting.  All restrictions 7/6 through 7/20/2020.  After this date, no restrictions unless this letter is updated.      Feel free to contact me with questions.    Sincerely,          Rod Alvarado MD

## 2021-06-25 NOTE — TELEPHONE ENCOUNTER
Refill Approved    Rx renewed per Medication Renewal Policy. Medication was last renewed on 10/13/2020.  Last office visit was 10/13/2020 with PCP. Was due for appt in April.    Karine Lee, [facility] Triage/Med Refill 6/10/2021     Requested Prescriptions   Pending Prescriptions Disp Refills     buPROPion (WELLBUTRIN XL) 150 MG 24 hr tablet 90 tablet 0     Sig: TAKE 1 TABLET(150 MG) BY MOUTH DAILY. Please make appointment for next refill       Tricyclics/Misc Antidepressant/Antianxiety Meds Refill Protocol Passed - 6/9/2021  9:37 AM        Passed - PCP or prescribing provider visit in last year     Last office visit with prescriber/PCP: 10/13/2020 Gracy Lawson MD OR same dept: 10/13/2020 Gracy Lawson MD OR same specialty: 10/13/2020 Gracy Lawson MD  Last physical: 4/2/2019 Last MTM visit: Visit date not found   Next visit within 3 mo: Visit date not found  Next physical within 3 mo: Visit date not found  Prescriber OR PCP: Gracy Lawson MD  Last diagnosis associated with med order: 1. Generalized anxiety disorder  - buPROPion (WELLBUTRIN XL) 150 MG 24 hr tablet; TAKE 1 TABLET(150 MG) BY MOUTH DAILY. Please make appointment for next refill  Dispense: 90 tablet; Refill: 0    2. Moderate major depression (H)  - buPROPion (WELLBUTRIN XL) 150 MG 24 hr tablet; TAKE 1 TABLET(150 MG) BY MOUTH DAILY. Please make appointment for next refill  Dispense: 90 tablet; Refill: 0    If protocol passes may refill for 12 months if within 3 months of last provider visit (or a total of 15 months).

## 2022-02-10 ENCOUNTER — OFFICE VISIT (OUTPATIENT)
Dept: FAMILY MEDICINE | Facility: CLINIC | Age: 47
End: 2022-02-10

## 2022-02-10 VITALS
HEIGHT: 73 IN | OXYGEN SATURATION: 98 % | WEIGHT: 211 LBS | DIASTOLIC BLOOD PRESSURE: 72 MMHG | HEART RATE: 104 BPM | BODY MASS INDEX: 27.96 KG/M2 | SYSTOLIC BLOOD PRESSURE: 126 MMHG | TEMPERATURE: 98.1 F

## 2022-02-10 DIAGNOSIS — K92.1 BLOOD IN STOOL: Primary | ICD-10-CM

## 2022-02-10 PROBLEM — F41.1 GAD (GENERALIZED ANXIETY DISORDER): Status: ACTIVE | Noted: 2022-02-10

## 2022-02-10 PROCEDURE — 99214 OFFICE O/P EST MOD 30 MIN: CPT | Performed by: FAMILY MEDICINE

## 2022-02-10 ASSESSMENT — MIFFLIN-ST. JEOR: SCORE: 1880.22

## 2022-02-10 ASSESSMENT — PATIENT HEALTH QUESTIONNAIRE - PHQ9: SUM OF ALL RESPONSES TO PHQ QUESTIONS 1-9: 0

## 2022-02-10 NOTE — PROGRESS NOTES
[facility] IN-OFFICE Visit  Phone : none    Chief Complaint:  Chief Complaint   Patient presents with     Rectal Problem     was seen in emergency and had testing done     Referral     for colonoscopy       Assessment/Plan:  Blood in stool  Nothing in past couple of days.  Had 3 days in a row early Feb and went to [facility] for full evaluation.  Did review Care Everywhere in his linked chart and normal exam and blood testing.  Advised to follow-up with colonoscopy.  Elevated ETOH level at that visit but pt denies extensive use.    - Adult Gastro Ref - Procedure Only  - Care Coordination Referral    Patient declined pneumonia nand influenza vaccines today due to lack of insurance coverage. Care coordination referral made to assist patient with applying for insurance. No need to repeat lab work today since it was done at the ED. Patient will schedule colonoscopy at his convenience when he has insurance.  Return in about 6 weeks (around 3/24/2022) for Health Maintenance Visit.      Diagnosis or treatment significantly limited by social determinants of health - no insurance .    Patient Education/AVS:  There are no Patient Instructions on file for this visit.    HPI:   Roderick Loyola is a 46 year old male and presents to clinic today for the following health issue: blood in the stools. He says the blood is primarily in the toilet but has noticed a streak in his stool. This has been on-going six months approximately once a month, but last week it occurred three days in a row which prompted him to go the the ED. Patient reports no hemorrhoids were found and all lab work was normal. ED physician recommended he follow up with primary care for a colonoscopy referral. He denies straining, difficulty passing stools, or hard stools. He reports he has increased gas but denies nausea, vomiting, diarrhea, or decreased appetite. He reports he has a good appetite and cites that as a  "reason for his weight gain. He did have LLQ pain the other day that resolved with defecation. Denies urinary symptoms. His family history is positive for pancreatic cancer (paternal grandfather) but no colorectal cancer.    No preventive care in several years.      History summarized from1-2:Seen by Dr Aguirre 10/13/20 for ELVER and MDD treated with wellbutrin 150mg daily and not since then. He is curently not taking Wellbutrin or prn ibuprofen. He was prescribed Colace at one point but is no longer taking it.  Old Records-1: Outside allergies, meds, problems and immunizations were reconciled as needed from CareEverywhere  Radiology tests reviewed-1: normal chest/abd/pelvic CT 6/2020  Lab tests reviewed-1: normal cbc 2019  2/3/22 [facility] ER visit reviewed in his linked [place] chart:  Normal cbc, cmp. Etc  Elevated ETOH level     Social History     Social History Narrative    Lives with Wife        Physical Exam:  /72 (BP Location: Right arm, Patient Position: Sitting, Cuff Size: Adult Regular)   Pulse 104   Temp 98.1  F (36.7  C)   Ht 1.845 m (6' 0.64\")   Wt 95.7 kg (211 lb)   SpO2 98%   BMI 28.12 kg/m   Body mass index is 28.12 kg/m . No LMP for male patient.  Vital signs reviewed  Wt Readings from Last 3 Encounters:   02/10/22 95.7 kg (211 lb)   10/13/20 88 kg (194 lb)   06/26/20 87.5 kg (193 lb)       PHQ-2 Score:     No flowsheet data found.      All normal as below except abnormalities include: LLQ tenderness  General is a  46 year old male sitting comfortably in no apparent distress wearing a mask.  Abd:  +BS, soft, ND,  No masses or organomegaly  Skin: No lesions or rashes noted  Neuro/MSK: Able to ambulate around the exam room with equal movement, strength and normal coordination of the upper and lower extremeties symmetrically    Jen Pimentel, Student NP  Physician Attestation   I, Razia Hathaway, saw this patient and have discussed and personally reviewed information outlined in this " document.    I agree with the findings and plan of care as documented in the note.      MD Razia Carlin MD  [facility]

## 2022-02-11 ENCOUNTER — PATIENT OUTREACH (OUTPATIENT)
Dept: CARE COORDINATION | Facility: CLINIC | Age: 47
End: 2022-02-11
Payer: COMMERCIAL

## 2022-02-14 ENCOUNTER — PATIENT OUTREACH (OUTPATIENT)
Dept: CARE COORDINATION | Facility: CLINIC | Age: 47
End: 2022-02-14
Payer: COMMERCIAL

## 2022-02-14 NOTE — PROGRESS NOTES
2/14/2022  Clinic Care Coordination Contact  Community Health Worker Initial Outreach  and [facility]/Voicemail    Referral: Financial and insurance support    Clinical Data: Care Coordinator Outreach  Outreach attempted x 2.  Left message on patient's voicemail with call back information and requested return call.  Plan: Care Coordinator sent care coordination introduction letter on 2-17-22 via mail.     Care Coordinator will do no further outreaches at this time.

## 2022-02-14 NOTE — LETTER
[facility] CARE COORDINATION  [address] [address]    February 14, 2022    Roderick Loyola  [address] [place] [address]      Dear Roderick,      I am a clinic community health worker who works with Gracy Lawson MD at [facility].  I have been trying to reach you recently to introduce Clinic Care Coordination and to see if there was anything I could assist you with.  Below is a description of clinic care coordination and how I can further assist you.      The clinic care coordination team is made up of a registered nurse,  and community health worker who understand the health care system. The goal of clinic care coordination is to help you manage your health and improve access to the health care system in the most efficient manner. The team can assist you in meeting your health care goals by providing education, coordinating services, strengthening the communication among your providers and supporting you with any resource needs.    Please feel free to contact me at [phone] with any questions or concerns. We are focused on providing you with the highest-quality healthcare experience possible and that all starts with you.     Sincerely,     Mele Martinez  Community Health Worker  [facility]  Clinic Care Coordination  [email or web address]  [email or web address]   Office: [phone]  Fax: [phone]